# Patient Record
Sex: MALE | Race: WHITE
[De-identification: names, ages, dates, MRNs, and addresses within clinical notes are randomized per-mention and may not be internally consistent; named-entity substitution may affect disease eponyms.]

---

## 2019-09-27 ENCOUNTER — HOSPITAL ENCOUNTER (OUTPATIENT)
Dept: HOSPITAL 43 - DL.ENDO | Age: 54
Discharge: HOME | End: 2019-09-27
Attending: INTERNAL MEDICINE
Payer: COMMERCIAL

## 2019-09-27 DIAGNOSIS — M19.90: ICD-10-CM

## 2019-09-27 DIAGNOSIS — F17.210: ICD-10-CM

## 2019-09-27 DIAGNOSIS — R19.5: Primary | ICD-10-CM

## 2019-09-27 DIAGNOSIS — K64.8: ICD-10-CM

## 2019-09-27 PROCEDURE — G0121 COLON CA SCRN NOT HI RSK IND: HCPCS

## 2019-09-27 PROCEDURE — 45378 DIAGNOSTIC COLONOSCOPY: CPT

## 2019-09-27 NOTE — OR
DATE:  09/27/2019

 

PROCEDURE:  Total colonoscopy.

 

INSTRUMENT USED:  CF-VI001U Olympus video colonoscope.

 

PREMEDICATIONS:  Fentanyl 100 mcg intravenous, Versed 4 mg intravenous.

 

The procedure was done under pulse oximetry, BP recording, and cardiac monitor.

 

INDICATION:  The patient with Hemoccult positive stools.  Colonoscopic

examination is done for detection of any polypoid lesions and removal,

endoscopic hemostasis therapy if needed.

 

DESCRIPTION OF PROCEDURE:  Initial rectal exam was unremarkable.  Rigid anoscopy

showed small internal hemorrhoids without bleeding from them.  The colonoscope

was passed with ease to the ileocecal area.  Photographs were taken of the

normal-appearing cecum, identified by landmarks of appendiceal orifice and

double-bulged ileocecal folds.  No bleeding was noted from any of the visualized

areas at the commencement of the examination.  No stricture.  No vascular

ectasia.  No large isolated ulcerations seen.  No evidence of diffuse

inflammatory bowel disease in the form of friability, contact bleeding, or

ulcerations.  No polyp or tumor mass identified.  Probing the proximal sides of

folds and flexures using adequate distention and clearing up the stool material,

withdrawal of the scope was made.  Cecum to rectum time over 6 minutes.  No

bleeding was noted from any of the visualized areas at the completion of

examination.  The bowel preparation was found to be adequate, Linville scale 2 in

all the regions.

 

IMPRESSION:  Internal hemorrhoids.

 

The patient tolerated the procedure well.

 

DD:  09/27/2019 07:50:37

DT:  09/27/2019 08:10:18

[facility]

Job #:  [id number]

## 2019-09-27 NOTE — LETTER
2019

 

 

 

 

 

Saul VALENTINO Mcknight

[facility]

[address]

[address]

[place]  [address]

 

RE:  DENZELKAILEYDASIA J

:  1965

 

Dear Mr. Olivovinnie:

 

Mr. Dasia Castrejon had colonoscopic examination done this morning and he

tolerated the procedure well.  I herewith send a copy of the endoscopy note and

photographs for your review.

 

Thank you.

 

Sincerely,

 

DD:  2019 07:50:37

DT:  2019 08:13:23

[facility]

Job #:  [id number]